# Patient Record
Sex: MALE | Race: OTHER | Employment: FULL TIME | URBAN - METROPOLITAN AREA
[De-identification: names, ages, dates, MRNs, and addresses within clinical notes are randomized per-mention and may not be internally consistent; named-entity substitution may affect disease eponyms.]

---

## 2021-01-31 ENCOUNTER — APPOINTMENT (OUTPATIENT)
Dept: GENERAL RADIOLOGY | Facility: HOSPITAL | Age: 26
End: 2021-01-31
Attending: PEDIATRICS

## 2021-01-31 ENCOUNTER — HOSPITAL ENCOUNTER (EMERGENCY)
Facility: HOSPITAL | Age: 26
Discharge: HOME | End: 2021-01-31
Attending: PEDIATRICS

## 2021-01-31 VITALS
TEMPERATURE: 98 F | WEIGHT: 200 LBS | SYSTOLIC BLOOD PRESSURE: 116 MMHG | DIASTOLIC BLOOD PRESSURE: 94 MMHG | RESPIRATION RATE: 18 BRPM | HEART RATE: 81 BPM | OXYGEN SATURATION: 98 %

## 2021-01-31 DIAGNOSIS — S93.601A SPRAIN OF RIGHT FOOT, INITIAL ENCOUNTER: Primary | ICD-10-CM

## 2021-01-31 PROCEDURE — 73610 X-RAY EXAM OF ANKLE: CPT | Performed by: PEDIATRICS

## 2021-01-31 PROCEDURE — 99283 EMERGENCY DEPT VISIT LOW MDM: CPT

## 2021-01-31 PROCEDURE — 73630 X-RAY EXAM OF FOOT: CPT | Performed by: PEDIATRICS

## 2021-01-31 NOTE — ED PROVIDER NOTES
Patient Seen in: [facility] Emergency Department      History   Patient presents with:  Leg or Foot Injury    Stated Complaint: R ankle pain post fall in snow    HPI/Subjective:   HPI    Patient is a 80-year-old male here complaining of right foot a and ankle injury. Differential includes sprain versus break. X-rays demonstrate no evidence of bony abnormality by my read.   Radiology read is as follows:  Xr Ankle (min 3 Views), Right (cpt=73610)    Result Date: 1/31/2021  PROCEDURE:  XR ANKLE (MIN 3 V this patient.

## 2021-01-31 NOTE — ED INITIAL ASSESSMENT (HPI)
Reports slipped on ice today, felt R foot/ankle roll. Pain to top of R foot, edema noted. CMS intact.